# Patient Record
Sex: FEMALE | Race: WHITE
[De-identification: names, ages, dates, MRNs, and addresses within clinical notes are randomized per-mention and may not be internally consistent; named-entity substitution may affect disease eponyms.]

---

## 2020-05-08 ENCOUNTER — HOSPITAL ENCOUNTER (EMERGENCY)
Dept: HOSPITAL 39 - ER | Age: 40
Discharge: HOME | End: 2020-05-08
Payer: COMMERCIAL

## 2020-05-08 VITALS — SYSTOLIC BLOOD PRESSURE: 99 MMHG | OXYGEN SATURATION: 97 % | TEMPERATURE: 96.9 F | DIASTOLIC BLOOD PRESSURE: 78 MMHG

## 2020-05-08 DIAGNOSIS — R10.32: Primary | ICD-10-CM

## 2020-05-08 DIAGNOSIS — K59.00: ICD-10-CM

## 2020-05-08 DIAGNOSIS — R31.9: ICD-10-CM

## 2020-05-08 DIAGNOSIS — N28.1: ICD-10-CM

## 2020-05-08 DIAGNOSIS — E03.9: ICD-10-CM

## 2020-05-08 PROCEDURE — 82550 ASSAY OF CK (CPK): CPT

## 2020-05-08 PROCEDURE — 84439 ASSAY OF FREE THYROXINE: CPT

## 2020-05-08 PROCEDURE — 84443 ASSAY THYROID STIM HORMONE: CPT

## 2020-05-08 PROCEDURE — 83735 ASSAY OF MAGNESIUM: CPT

## 2020-05-08 PROCEDURE — 85730 THROMBOPLASTIN TIME PARTIAL: CPT

## 2020-05-08 PROCEDURE — 74177 CT ABD & PELVIS W/CONTRAST: CPT

## 2020-05-08 PROCEDURE — 80053 COMPREHEN METABOLIC PANEL: CPT

## 2020-05-08 PROCEDURE — 83880 ASSAY OF NATRIURETIC PEPTIDE: CPT

## 2020-05-08 PROCEDURE — 85025 COMPLETE CBC W/AUTO DIFF WBC: CPT

## 2020-05-08 PROCEDURE — 80307 DRUG TEST PRSMV CHEM ANLYZR: CPT

## 2020-05-08 PROCEDURE — 83690 ASSAY OF LIPASE: CPT

## 2020-05-08 PROCEDURE — 36415 COLL VENOUS BLD VENIPUNCTURE: CPT

## 2020-05-08 PROCEDURE — 83605 ASSAY OF LACTIC ACID: CPT

## 2020-05-08 PROCEDURE — 82553 CREATINE MB FRACTION: CPT

## 2020-05-08 PROCEDURE — 87040 BLOOD CULTURE FOR BACTERIA: CPT

## 2020-05-08 PROCEDURE — 74019 RADEX ABDOMEN 2 VIEWS: CPT

## 2020-05-08 PROCEDURE — 84484 ASSAY OF TROPONIN QUANT: CPT

## 2020-05-08 PROCEDURE — 85610 PROTHROMBIN TIME: CPT

## 2020-05-08 PROCEDURE — 82150 ASSAY OF AMYLASE: CPT

## 2020-05-08 PROCEDURE — 84703 CHORIONIC GONADOTROPIN ASSAY: CPT

## 2020-05-08 PROCEDURE — 81001 URINALYSIS AUTO W/SCOPE: CPT

## 2020-05-08 PROCEDURE — 93005 ELECTROCARDIOGRAM TRACING: CPT

## 2020-05-08 PROCEDURE — 85379 FIBRIN DEGRADATION QUANT: CPT

## 2020-05-08 RX ADMIN — Medication ONE MLS/HR: at 05:50

## 2020-05-08 RX ADMIN — POTASSIUM CHLORIDE ONE MEQ: 20 SOLUTION ORAL at 06:39

## 2020-05-08 NOTE — CT
EXAM: CT Abdomen/Pelvis with Contrast



HISTORY: llq pain intermit 3 weeks, distant hx bowel obs 



COMPARISON: Abdomen two views 5/8/2020



TECHNIQUE: Abdomen/pelvis axial images acquired  IV contrast.

Coronal and sagittal reformats created. Exam performed according

to departmental dose-optimization program which includes

automated exposure control, adjustment of mA and/or kV according

to patient size, and/or use of iterative reconstruction

technique.



FINDINGS:

No free air or significant free fluid.

Liver, gallbladder, spleen, pancreas, adrenals, both adnexa, and

urinary bladder unremarkable.

Hysterectomy.

Few tiny hypodense round bilateral kidney lesions are too small

to fully characterize by CT size criteria but statistically

likely represent cysts.

Small nonobstructing left renal midlevel stone.

Nonopacified stomach, small bowel, appendix, and large bowel

appear grossly unremarkable.

Unremarkable abdominal aorta.

Multiple small round bilateral inferior pelvic calcifications

likely represent phleboliths.

Bones unremarkable.



IMPRESSION:

1. Small nonobstructing left renal stone.

2. Few tiny hypodense round bilateral kidney lesions are too

small to fully characterize by CT size criteria but statistically

likely represent cysts.

3. Hysterectomy.



Electronically signed by:  Edmundo Pineda MD  5/8/2020 7:29 AM CDT

Workstation: [id number]

## 2020-05-08 NOTE — ED.PDOC
History of Present Illness





- General


Source: patient


Exam Limitations: no limitations





- History of Present Illness


Initial Comments: 





The patient is a 39-year-old  female brought in by EMS secondary to 

left lower quadrant abdominal pain that is been going on for 3 weeks.  It got 

much worse last night.  The patient does have a hard time sitting still.  She 

reports that a lot of times she feels like she needs to urinate but cannot.  She

feels like she may be having a problem emptying her bladder.  She does have 

known chronic constipation likely related to her Adderall.  The patient received

Zofran and Toradol prior to arrival.  She does appear to have some difficulty 

mentating currently whether it is from the medications or pain or something else

I am uncertain.  Pupils are fairly small.  She is alert and oriented.  No 

respiratory distress.  No chest pain.  She reports a couple of episodes of 

nausea and vomiting over the last few days.  The patient has had a hysterectomy 

in the past and she apparently had a abdominal surgery a few years ago for a 

bowel obstruction.  No known history of any diverticulitis.  Axillary 

temperature on the patient was 89 and a temporal temperature was 94.  An oral 

temperature is currently being obtained.  The patient does have a history of 

hypothyroidism but reports that she is taking her Synthroid.  No history of 

sepsis.  No history of any cardiac problems.  No syncope.





Corrected oral temperature is 97.4.


Timing/Duration: getting worse, other - Weeks


Improving Factors: nothing


Worsening Factors: nothing


Associated Symptoms: diaphoresis, loss of appetite, malaise, nausea/vomiting





<Robel Gonzales - Last Filed: 05/08/20 06:40>





<Paulo Min - Last Filed: 05/08/20 07:47>





- General


Time Seen by Provider: 05/08/20 05:16





- History of Present Illness


Allergies/Adverse Reactions: 


Allergies





NO KNOWN ALLERGY Allergy (Unverified 09/12/13 13:01)


   





Home Medications: 


Ambulatory Orders





Amphetamine-Dextroamphetamine [Adderall] 1 tab PO 05/08/20 


Dicyclomine HCl [Bentyl] 20 mg PO Q6H PRN #20 tab 05/08/20 


Ibuprofen [Motrin] 400 mg PO Q6H PRN #20 tab 05/08/20 


Levothyroxine Sodium [Synthroid] 50 mcg PO 05/08/20 


Polyethylene Glycol 3350 [Miralax] 17 gm PO DAILY 15 Days #238 bottle 05/08/20 











Review of Systems





- Review of Systems


Constitutional: States: diaphoresis, malaise


EENTM: States: no symptoms reported


Respiratory: States: no symptoms reported


Cardiology: States: no symptoms reported


Gastrointestinal/Abdominal: States: constipation, nausea, vomiting


Genitourinary: States: see HPI


Musculoskeletal: States: back pain - Left lower


Skin: States: no symptoms reported


Neurological: States: anxiety


Endocrine: States: no symptoms reported


All other Systems: No Change from Baseline





<Robel Gonzales - Last Filed: 05/08/20 06:40>





Past Medical History (General)





- Patient Medical History


Hx Seizures: No


Hx Stroke: No


Hx Dementia: No


Hx Asthma: No


Hx of COPD: No


Hx Cardiac Disorders: No


Hx Congestive Heart Failure: No


Hx Pacemaker: No


Hx Hypertension: No


Hx Thyroid Disease: Yes


Hx Diabetes: No


Hx Gastroesophageal Reflux: Yes


Hx Renal Disease: No


Hx of HIV: No


Hx MRSA: No


Surgical History: Hysterectomy, other





- Vaccination History


Hx Tetanus, Diphtheria Vaccination: No


Hx Influenza Vaccination: No


Hx Pneumococcal Vaccination: No


Immunizations Comment: refuses vaccines





- Social History


Hx Tobacco Use: No


Hx Alcohol Use: No


Hx Substance Use: No


Hx Substance Use Treatment: No


Hx Depression: No





- Female History


Patient is a Female of Child Bearing Age (10 -59 yrs old): Yes - hysterectomy





<Robel Gonzales - Last Filed: 05/08/20 06:40>





Family Medical History





- Family History


  ** Mother


Family History: No Known





<Robel Gonzales - Last Filed: 05/08/20 06:40>





Physical Exam





- Physical Exam


General Appearance: Alert, Anxious, Obvious distress


Eye Exam: bilateral normal


Ears, Nose, Throat: hearing grossly normal, normal ENT inspection


Neck: full range of motion, supple


Respiratory: lungs clear, normal breath sounds, no respiratory distress, no 

accessory muscle use


Cardiovascular/Chest: normal peripheral pulses, regular rate, rhythm - 

Borderline sinus bradycardia, no edema


Peripheral Pulses: radial,right: 2+, radial,left: 2+


Gastrointestinal/Abdominal: soft, other - Left lateral and lower abdominal 

discomfort to palpation.


Rectal Exam: deferred


Back Exam: no vertebral tenderness, CVA tenderness (L) - Questionable


Extremity: normal range of motion, non-tender, normal inspection, no pedal 

edema, no calf tenderness, normal capillary refill


Neurologic: CNs II-XII nml as tested, alert, oriented x 3, other - Patient is 

technically alert and oriented x3.  Mentation seems to be slow or focus is poor.


Skin Exam: pallor


Comments: 





                               Vital Signs - 24 hr











  05/08/20 05/08/20





  05:36 06:00


 


Temperature 94.4 F L 97.4 F L


 


Pulse Rate [ 53 L 





montior]  


 


Respiratory 20 





Rate  


 


Blood Pressure 137/91 





[Left Arm]  


 


O2 Sat by Pulse 100 





Oximetry  














<oRbel Gonzales - Last Filed: 05/08/20 06:40>





Progress





- Progress


Progress: 





05/08/20 06:40


The patient is a 39-year-old  female presented emergency room with 3 

weeks of intermittent left lower quadrant pain worsening tonight.  She is not 

entirely certain at this point.  CT scan of abdomen pelvis with contrast is 

being done.  Pain has significantly improved since arrival.  Pain is gone from a

9 down to a 3.  Bedside ultrasound by me post void shows minimal residual.  

There is some hematuria on the lab work.  She cannot get past.  Patient will be 

followed by oncoming ER physician.  Vital signs are remaining stable.





- Results/Orders


Results/Orders: 





EKG shows normal sinus rhythm at 65 bpm.  Borderline right axis deviation.  

Normal R wave progression.  No ST segment or T wave changes indicative of acute 

ischemia.  Borderline prolonged QT interval.  Sinus arrhythmia.





<Robel Gonzales - Last Filed: 05/08/20 06:40>





- Progress


Progress: 








05/08/20 07:00


I assumed care of the patient after a detailed discussion with Dr. Gonzales regard

ing her presentation of left lower quadrant pain intermittently for the past 3 

weeks with associated nausea and vomiting.  CT pending at this time.  UA did 

show some microscopic hematuria.  Vital signs are reassuring at this time, pain 

improved from 9-3 after Toradol. CJT





05/08/20 07:46


Rechecked.  Pain remains improved.  Reviewed CT/lab findings and plan for 

discharge.  Recommended follow-up with PCP in 3 to 5 days for recheck.  Strict 

warnings given to return the emergency room for worsening pain, fever, blood in 

stool, vomiting blood, blood in urine, or any other concerns.





Paulo Min DO


[facility] #[id number]





- Results/Orders


Results/Orders: 





EXAM: CT Abdomen/Pelvis with Contrast  





HISTORY: llq pain intermit 3 weeks, distant hx bowel obs  


COMPARISON: Abdomen two views 5/8/2020  


TECHNIQUE: Abdomen/pelvis axial images acquired IV contrast. Coronal and 

sagittal reformats created. Exam performed according to departmental dose-

optimization program which includes automated exposure control, adjustment of mA

and/or kV according to patient size, and/or use of iterative reconstruction 

technique.  





FINDINGS: No free air or significant free fluid. Liver, gallbladder, spleen, 

pancreas, adrenals, both adnexa, and urinary bladder unremarkable. Hysterectomy.

Few tiny hypodense round bilateral kidney lesions are too small to fully 

characterize by CT size criteria but statistically likely represent cysts. Small

nonobstructing left renal midlevel stone. Nonopacified stomach, small bowel, 

appendix, and large bowel appear grossly unremarkable. Unremarkable abdominal 

aorta. Multiple small round bilateral inferior pelvic calcifications likely 

represent phleboliths. Bones unremarkable.  





IMPRESSION: 


1. Small nonobstructing left renal stone. 


2. Few tiny hypodense round bilateral kidney lesions are too small to fully 

characterize by CT size criteria but statistically likely represent cysts. 


3. Hysterectomy.  





Electronically signed by: Edmundo Pineda MD 5/8/2020 7:29 AM CDT





<Paulo Min - Last Filed: 05/08/20 07:47>





Departure





<Robel Gonzales - Last Filed: 05/08/20 06:40>





<Paulo Min - Last Filed: 05/08/20 07:47>





- Departure


Clinical Impression: 


 Microscopic hematuria, Renal cyst





Abdominal pain


Qualifiers:


 Abdominal location: left lower quadrant Qualified Code(s): R10.32 - Left lower 

quadrant pain





Constipation


Qualifiers:


 Constipation type: unspecified constipation type Qualified Code(s): K59.00 - 

Constipation, unspecified





Disposition: Discharge to Home or Self Care


Condition: Good


Instructions:  DI for Abdominal Pain-Adult


Referrals: 


Adriel Dailey III, MD [Primary Care Provider] - 1-5 Days


Prescriptions: 


Dicyclomine HCl [Bentyl] 20 mg PO Q6H PRN #20 tab


 PRN Reason: Abdominal Cramping


Ibuprofen [Motrin] 400 mg PO Q6H PRN #20 tab


 PRN Reason: Pain


Polyethylene Glycol 3350 [Miralax] 17 gm PO DAILY 15 Days #238 bottle


Home Medications: 


Ambulatory Orders





Amphetamine-Dextroamphetamine [Adderall] 1 tab PO 05/08/20 


Dicyclomine HCl [Bentyl] 20 mg PO Q6H PRN #20 tab 05/08/20 


Ibuprofen [Motrin] 400 mg PO Q6H PRN #20 tab 05/08/20 


Levothyroxine Sodium [Synthroid] 50 mcg PO 05/08/20 


Polyethylene Glycol 3350 [Miralax] 17 gm PO DAILY 15 Days #238 bottle 05/08/20 








Additional Instructions: 


Return to the emergency room immediately for worsening pain, fever, increased 

blood in urine, vomiting uncontrollably, or any other concerns

## 2020-05-08 NOTE — RAD
EXAM:



Acute abdominal series.



INDICATION:  



Left lower quadrant pain.



COMPARISON: 



None.



FINDINGS:



Cardiac silhouette:  Unremarkable.



Allison:  Unremarkable.



Lobar consolidation:  None.

Pleural effusion:  None.

Pneumothorax:  None.

Other:  None.



Intraperitoneal free air:  Negative.



Bowel: No dilated loops of small bowel or air-fluid levels.



Bones:  Unremarkable.



Other:  There is a 3 mm calcification superimposed upon the left

kidney. Phleboliths are noted within the pelvis.



IMPRESSION:



3 mm calcification superimposed upon the left kidney which may

represent nephrolithiasis.

Nonspecific, nonobstructed bowel gas pattern.



Electronically signed by:  Adam Ramos MD  5/8/2020 6:07 AM CDT

Workstation: [id number]

## 2020-05-13 ENCOUNTER — HOSPITAL ENCOUNTER (OUTPATIENT)
Dept: HOSPITAL 39 - RAD | Age: 40
End: 2020-05-13
Attending: FAMILY MEDICINE
Payer: COMMERCIAL

## 2020-05-13 DIAGNOSIS — N20.0: Primary | ICD-10-CM

## 2020-05-14 NOTE — RAD
EXAM DESCRIPTION:

IVP Intravenous Pyelogram/ Excretory Urogram: RF



CLINICAL HISTORY:

39 years Female, LOWER ABD PN, UNSPECIFIED. 4 mm nonobstructing

left renal stone recent CT scan abdomen and pelvis.



COMPARISON:

CT scan of the abdomen and pelvis with IV contrast May 8 and

October 2019..



TECHNIQUE:

Examination was monitored by Dr. Medina. Supine AP  image

of the abdomen. Nonionic IV contrast injected followed by

immediate AP kidneys. Five minute Supine AP  abdomen and pelvis.

10 minute Bilateral supine oblique AP abdomen and pelvis. 10

minute AP pelvis. 15 minute AP supine abdomen and pelvis. 20

minute AP prone abdomen and pelvis. Post void AP supine abdomen

and pelvis. No adverse reactions.



FINDINGS:

 film shows a radiodense stone overlying the mid to upper

left kidney. Similar location to a 4 mm radiodense stone seen on

the prior 2 abdominal CT scans. Upper kidney is not on the image.

Right kidney is obscured by gas and fecal material in the colon.

No radiodense stones overlying the ureteral tracts. Bilateral

pelvic vascular calcifications. Bowel gas pattern otherwise

unremarkable.

After administration of IV contrast, immediate symmetric

visualization of nephrogram phase, bilateral kidneys. Long axis

right kidney 12.2 cm and left kidney 13.4 cm. Radiodense stone

stable in position left kidney.

On the 5 minute image, the radiodense stone is noted within a mid

left renal calyx. No hydronephrosis or mass effect bilaterally.

No filling defects in the renal parenchyma bilaterally. Stable

position of the stone on the oblique images. No mass effect on

the urinary bladder.

15 minute supine and prone images show no hydronephrosis or

hydroureter. Distal left ureter is not well seen but proximal

ureter is not distended, and left renal stone is stable in

position. The left ureter almost completely visualized on the 10

minute AP image and normal caliber.

Image of the lower abdomen and pelvis post void shows the pelvic

calcifications are vascular and not associated with urinary

tracts. No asymmetry of the post void bladder..



IMPRESSION:

1. 4 mm radiodense stone in the mid collecting system of the left

kidney also seen on prior CT scans in May and also 2019. Stable

position and size. No hydronephrosis or hydroureter bilaterally.

No radiodense stones in the right urinary tract. No radiodense

stones in the urinary bladder.



Electronically signed by:  Roderick Medina MD  5/14/2020 11:33 AM

CDT Workstation: [id number]

## 2020-05-15 ENCOUNTER — HOSPITAL ENCOUNTER (OUTPATIENT)
Dept: HOSPITAL 39 - US | Age: 40
End: 2020-05-15
Attending: SPECIALIST
Payer: COMMERCIAL

## 2020-05-15 DIAGNOSIS — R10.9: ICD-10-CM

## 2020-05-15 DIAGNOSIS — R19.09: Primary | ICD-10-CM

## 2020-05-15 DIAGNOSIS — I87.8: ICD-10-CM

## 2020-05-15 NOTE — US
EXAM DESCRIPTION:



Soft Tissue,Extremity



CLINICAL HISTORY:



39 years Female, LEFT INGUINAL MASS



COMPARISON:



CT abdomen pelvis May 8, 2020. CT abdomen pelvis October 15,

2019.



TECHNIQUE:



Targeted sonographic imaging of the superficial structures left

lower quadrant in the area palpated by the patient.



FINDINGS:



Sonographic imaging of the area left lower quadrant inguinal

region area palpated by the patient was performed by the

sonographer. This area shows a vascular structure measuring

approximately 2.8 x 0.6 x 1.4 cm respectively. No Doppler

waveform was documented to show if this is arterial or venous

although the sonographer states these are superficial veins on

report. On comparison CT, there is asymmetric prominence of a

vascular complex which extends through the left inguinal canal

into the mons pubis. I suspect this is the area that is imaged

under ultrasound on this study. This vascular structure is in

direct communication with the left gonadal vein or the

salpingo-ovarian plexus. No solid mass or abnormal cystic

structure is demonstrated at this location



IMPRESSION:



Prominent vascular plexus palpated by the patient in the left

lower quadrant inguinal region believed to represent prominent

salpingo-ovarian plexus on comparison CT. This would be the

female equivalent of a varicocele. No mass, lymphadenopathy or

abnormal cystic structure.



Electronically signed by:  Mark Ojeda MD  5/15/2020 3:35 PM

CDT Workstation: [id number]

## 2020-10-12 ENCOUNTER — HOSPITAL ENCOUNTER (OUTPATIENT)
Dept: HOSPITAL 39 - GMAL | Age: 40
End: 2020-10-12
Attending: FAMILY MEDICINE
Payer: COMMERCIAL

## 2020-10-12 DIAGNOSIS — E55.9: ICD-10-CM

## 2020-10-12 DIAGNOSIS — E34.9: ICD-10-CM

## 2020-10-12 DIAGNOSIS — E53.8: Primary | ICD-10-CM

## 2020-10-12 DIAGNOSIS — Z79.899: ICD-10-CM

## 2020-10-12 DIAGNOSIS — R53.82: ICD-10-CM
